# Patient Record
Sex: MALE | URBAN - METROPOLITAN AREA
[De-identification: names, ages, dates, MRNs, and addresses within clinical notes are randomized per-mention and may not be internally consistent; named-entity substitution may affect disease eponyms.]

---

## 2022-08-05 ENCOUNTER — TRANSFERRED RECORDS (OUTPATIENT)
Dept: HEALTH INFORMATION MANAGEMENT | Facility: CLINIC | Age: 47
End: 2022-08-05

## 2022-08-09 ENCOUNTER — MEDICAL CORRESPONDENCE (OUTPATIENT)
Dept: HEALTH INFORMATION MANAGEMENT | Facility: CLINIC | Age: 47
End: 2022-08-09

## 2022-08-10 ENCOUNTER — TRANSCRIBE ORDERS (OUTPATIENT)
Dept: OTHER | Age: 47
End: 2022-08-10

## 2022-08-10 DIAGNOSIS — E11.65 TYPE 2 DIABETES MELLITUS WITH HYPERGLYCEMIA (H): Primary | ICD-10-CM

## 2025-02-24 ENCOUNTER — HOSPITAL ENCOUNTER (EMERGENCY)
Facility: HOSPITAL | Age: 50
Discharge: HOME | End: 2025-02-24
Attending: EMERGENCY MEDICINE | Admitting: EMERGENCY MEDICINE
Payer: COMMERCIAL

## 2025-02-24 VITALS
BODY MASS INDEX: 31.27 KG/M2 | SYSTOLIC BLOOD PRESSURE: 142 MMHG | HEIGHT: 62 IN | HEART RATE: 75 BPM | TEMPERATURE: 97.8 F | DIASTOLIC BLOOD PRESSURE: 87 MMHG | OXYGEN SATURATION: 99 % | RESPIRATION RATE: 16 BRPM | WEIGHT: 169.9 LBS

## 2025-02-24 DIAGNOSIS — M54.12 CERVICAL RADICULOPATHY: ICD-10-CM

## 2025-02-24 LAB
ANION GAP SERPL CALCULATED.3IONS-SCNC: 8 MMOL/L (ref 7–15)
BASOPHILS # BLD AUTO: 0 10E3/UL (ref 0–0.2)
BASOPHILS NFR BLD AUTO: 1 %
BUN SERPL-MCNC: 16.1 MG/DL (ref 6–20)
CALCIUM SERPL-MCNC: 8.8 MG/DL (ref 8.8–10.4)
CHLORIDE SERPL-SCNC: 101 MMOL/L (ref 98–107)
CREAT SERPL-MCNC: 0.85 MG/DL (ref 0.67–1.17)
EGFRCR SERPLBLD CKD-EPI 2021: >90 ML/MIN/1.73M2
EOSINOPHIL # BLD AUTO: 0.2 10E3/UL (ref 0–0.7)
EOSINOPHIL NFR BLD AUTO: 3 %
ERYTHROCYTE [DISTWIDTH] IN BLOOD BY AUTOMATED COUNT: 12.8 % (ref 10–15)
GLUCOSE SERPL-MCNC: 426 MG/DL (ref 70–99)
HCO3 SERPL-SCNC: 28 MMOL/L (ref 22–29)
HCT VFR BLD AUTO: 42 % (ref 40–53)
HGB BLD-MCNC: 14.4 G/DL (ref 13.3–17.7)
IMM GRANULOCYTES # BLD: 0 10E3/UL
IMM GRANULOCYTES NFR BLD: 0 %
LYMPHOCYTES # BLD AUTO: 2.3 10E3/UL (ref 0.8–5.3)
LYMPHOCYTES NFR BLD AUTO: 33 %
MCH RBC QN AUTO: 28 PG (ref 26.5–33)
MCHC RBC AUTO-ENTMCNC: 34.3 G/DL (ref 31.5–36.5)
MCV RBC AUTO: 82 FL (ref 78–100)
MONOCYTES # BLD AUTO: 0.6 10E3/UL (ref 0–1.3)
MONOCYTES NFR BLD AUTO: 9 %
NEUTROPHILS # BLD AUTO: 3.7 10E3/UL (ref 1.6–8.3)
NEUTROPHILS NFR BLD AUTO: 54 %
NRBC # BLD AUTO: 0 10E3/UL
NRBC BLD AUTO-RTO: 0 /100
PLATELET # BLD AUTO: 199 10E3/UL (ref 150–450)
POTASSIUM SERPL-SCNC: 4.2 MMOL/L (ref 3.4–5.3)
RBC # BLD AUTO: 5.14 10E6/UL (ref 4.4–5.9)
SODIUM SERPL-SCNC: 137 MMOL/L (ref 135–145)
TROPONIN T SERPL HS-MCNC: 8 NG/L
WBC # BLD AUTO: 6.8 10E3/UL (ref 4–11)

## 2025-02-24 PROCEDURE — 36415 COLL VENOUS BLD VENIPUNCTURE: CPT | Performed by: EMERGENCY MEDICINE

## 2025-02-24 PROCEDURE — 84484 ASSAY OF TROPONIN QUANT: CPT | Performed by: EMERGENCY MEDICINE

## 2025-02-24 PROCEDURE — 82565 ASSAY OF CREATININE: CPT | Performed by: EMERGENCY MEDICINE

## 2025-02-24 PROCEDURE — 84295 ASSAY OF SERUM SODIUM: CPT | Performed by: EMERGENCY MEDICINE

## 2025-02-24 PROCEDURE — 82435 ASSAY OF BLOOD CHLORIDE: CPT | Performed by: EMERGENCY MEDICINE

## 2025-02-24 PROCEDURE — 93005 ELECTROCARDIOGRAM TRACING: CPT | Performed by: EMERGENCY MEDICINE

## 2025-02-24 PROCEDURE — 85025 COMPLETE CBC W/AUTO DIFF WBC: CPT | Performed by: EMERGENCY MEDICINE

## 2025-02-24 PROCEDURE — 99284 EMERGENCY DEPT VISIT MOD MDM: CPT

## 2025-02-24 RX ORDER — CYCLOBENZAPRINE HCL 10 MG
10 TABLET ORAL 3 TIMES DAILY PRN
Qty: 21 TABLET | Refills: 0 | Status: SHIPPED | OUTPATIENT
Start: 2025-02-24 | End: 2025-02-24

## 2025-02-24 RX ORDER — CYCLOBENZAPRINE HCL 10 MG
10 TABLET ORAL 3 TIMES DAILY PRN
Qty: 21 TABLET | Refills: 0 | Status: SHIPPED | OUTPATIENT
Start: 2025-02-24

## 2025-02-24 ASSESSMENT — ENCOUNTER SYMPTOMS: NECK PAIN: 1

## 2025-02-24 NOTE — DISCHARGE INSTRUCTIONS
Recommend follow-up with your primary care doctor and [facility].  Use Flexeril 3 times daily for muscle stiffness.Ibuprofen and/or Tylenol for pain

## 2025-02-24 NOTE — Clinical Note
Vidhi Lima was seen and treated in our emergency department on 2/24/2025.  He may return to work on 02/25/2025.       If you have any questions or concerns, please don't hesitate to call.      Anmol Romo MD

## 2025-02-24 NOTE — ED PROVIDER NOTES
"EMERGENCY DEPARTMENT ENCOUNTER      NAME: Vidhi Lima  AGE: 49 year old male  YOB: 1975  MRN: [id number]  EVALUATION DATE & TIME: No admission date for patient encounter.    PCP: Pamela Ellis    ED PROVIDER: Anmol Romo MD      Chief Complaint   Patient presents with    Headache    Arm Pain    Shoulder Pain         FINAL IMPRESSION:  1. Cervical radiculopathy          ED COURSE & MEDICAL DECISION MAKING:    Pertinent Labs & Imaging studies reviewed. (See chart for details)  49 year old male presents to the Emergency Department for evaluation of neck and shoulder pain.  On exam his history and exam seem most just unsympathetic back there is a COVID-19 weakness.  Emergent imaging not indicated.  With her urgent care today mentions chest pain\" EKG and troponin obtained here.  EKG and troponin are not suggestive of ACS.  Based on duration sliding-scale for GI bleed.  Plan for discharge home with Flexeril, follow-up with [facility] primary [facility]    ED Course as of 02/24/25 1452   Mon Feb 24, 2025   1155 I met with the patient for initial interview and encounter. We discussed a plan for treatment and diagnostic interventions.           Medical Decision Making  Obtained supplemental history:Supplemental history obtained?: No  Reviewed external records: External records reviewed?: No  Care impacted by chronic illness:Documented in Chart  Did you consider but not order tests?: Work up considered but not performed and documented in chart, if applicable  Did you interpret images independently?: Independent interpretation of ECG and images noted in documentation, when applicable.  Consultation discussion with other provider:Did you involve another provider (consultant, MH, pharmacy, etc.)?: No  Discharge. I prescribed additional prescription strength medication(s) as charted. I considered admission, but ultimately discharged patient reassuring cardiac workup.    MIPS (CTPE, Dental pain, Rodriguez, Sinusitis, " Asthma/COPD, Head Trauma): Not Applicable            At the conclusion of the encounter I discussed the results of all of the tests and the disposition. The questions were answered. The patient or family acknowledged understanding and was agreeable with the care plan.         MEDICATIONS GIVEN IN THE EMERGENCY:  Medications - No data to display    NEW PRESCRIPTIONS STARTED AT TODAY'S ER VISIT  New Prescriptions    CYCLOBENZAPRINE (FLEXERIL) 10 MG TABLET    Take 1 tablet (10 mg) by mouth 3 times daily as needed for muscle spasms.          =================================================================    TRIAGE ASSESSMENT:  For the past week pt has had pain in the back of the head that radiates down L shoulder and L arm.      Denies injury.              HPI    Patient information was obtained from: the patient     Use of : Yes (MARTII) - Language Gordy Lima is a 49 year old male with no pertinent medical history who presents to this ED by private car for evaluation of arm pain and chest pain.     The patient reports waking up with sudden left upper-arm pain about 1 week ago. States the pain radiates to his left-sided neck and there is tingly sensation associated. States his neck feels stiff. He has been taking chencho medication for the pain. 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     The patient also reports going to urgent care prior to arrival for intermittent chest pain but they advised him to present here instead. Denies any chest pain right now.     The patient denies any other complaints at this time.       REVIEW OF SYSTEMS   Review of Systems   Cardiovascular:  Negative for chest pain (intermittent, resolved).   Musculoskeletal:  Positive for neck pain (left side).        Reports left arm pain.          PAST MEDICAL HISTORY:  History reviewed. No pertinent past medical history.    PAST SURGICAL HISTORY:  History reviewed. No pertinent surgical history.        CURRENT MEDICATIONS:   "  cyclobenzaprine (FLEXERIL) 10 MG tablet        ALLERGIES:  No Known Allergies    FAMILY HISTORY:  History reviewed. No pertinent family history.    SOCIAL HISTORY:   Social History     Socioeconomic History    Marital status: Single   Tobacco Use    Smoking status: Never     Passive exposure: Never    Smokeless tobacco: Never   Vaping Use    Vaping status: Never Used     Social Drivers of Health      Received from [facility]    Financial Resource Strain    Received from [facility]    Social Connections       VITALS:  BP (!) 144/80   Pulse 80   Temp 97.8  F (36.6  C)   Resp 18   Ht 1.562 m (5' 1.5\")   Wt 77.1 kg (169 lb 14.4 oz)   SpO2 99%   BMI 31.58 kg/m      PHYSICAL EXAM      Vitals: BP (!) 144/80   Pulse 80   Temp 97.8  F (36.6  C)   Resp 18   Ht 1.562 m (5' 1.5\")   Wt 77.1 kg (169 lb 14.4 oz)   SpO2 99%   BMI 31.58 kg/m    General: Appears in no acute distress, awake, alert, interactive.  Eyes: Conjunctivae non-injected. Sclera anicteric.  HENT: Atraumatic.  Neck: Supple.  Respiratory/Chest: Respiration unlabored.  No wheezing or rhonchi  Abdomen: non distended  Musculoskeletal: Normal extremities. No edema or erythema.  limited range of motion bilateral shoulders.  Here reports neck stiffness.  Overall neck seems supple.  No weakness to extremity  Skin: Normal color. No rash or diaphoresis.  Neurologic: Face symmetric, moves all extremities spontaneously. Speech clear.  Psychiatric: Oriented to person, place, and time. Affect appropriate.    LAB:  All pertinent labs reviewed and interpreted.  Results for orders placed or performed during the hospital encounter of 02/24/25   Result Value Ref Range    Troponin T, High Sensitivity 8 <=22 ng/L   Basic metabolic panel   Result Value Ref Range    Sodium 137 135 - 145 mmol/L    Potassium 4.2 3.4 - 5.3 mmol/L    Chloride 101 98 - 107 mmol/L    Carbon Dioxide (CO2) 28 22 - 29 mmol/L    " Anion Gap 8 7 - 15 mmol/L    Urea Nitrogen 16.1 6.0 - 20.0 mg/dL    Creatinine 0.85 0.67 - 1.17 mg/dL    GFR Estimate >90 >60 mL/min/1.73m2    Calcium 8.8 8.8 - 10.4 mg/dL    Glucose 426 (H) 70 - 99 mg/dL   CBC with platelets and differential   Result Value Ref Range    WBC Count 6.8 4.0 - 11.0 10e3/uL    RBC Count 5.14 4.40 - 5.90 10e6/uL    Hemoglobin 14.4 13.3 - 17.7 g/dL    Hematocrit 42.0 40.0 - 53.0 %    MCV 82 78 - 100 fL    MCH 28.0 26.5 - 33.0 pg    MCHC 34.3 31.5 - 36.5 g/dL    RDW 12.8 10.0 - 15.0 %    Platelet Count 199 150 - 450 10e3/uL    % Neutrophils 54 %    % Lymphocytes 33 %    % Monocytes 9 %    % Eosinophils 3 %    % Basophils 1 %    % Immature Granulocytes 0 %    NRBCs per 100 WBC 0 <1 /100    Absolute Neutrophils 3.7 1.6 - 8.3 10e3/uL    Absolute Lymphocytes 2.3 0.8 - 5.3 10e3/uL    Absolute Monocytes 0.6 0.0 - 1.3 10e3/uL    Absolute Eosinophils 0.2 0.0 - 0.7 10e3/uL    Absolute Basophils 0.0 0.0 - 0.2 10e3/uL    Absolute Immature Granulocytes 0.0 <=0.4 10e3/uL    Absolute NRBCs 0.0 10e3/uL       RADIOLOGY:  Reviewed all pertinent imaging. Please see official radiology report.  No orders to display       EKG:    Performed at: 2/24/2025 11:58    Impression: Sinus rhythm. Inferior infarct, age undetermined. Possible Anterolateral infarct, age undetermined.     Rate: 81 bpm  Rhythm: Sinus rhythm  Axis: 64  -14  13  CO Interval: 140 ms  QRS Interval: 98 ms  QTc Interval: 434 ms  ST Changes: None  Comparison: No previous ECGs available    I have independently reviewed and interpreted the EKG(s) documented above.      IDaniele, am serving as a scribe to document services personally performed by Anmol Romo MD based on my observation and the provider's statements to me. I, Anmol Romo MD, attest that Daniele Rose is acting in a scribe capacity, has observed my performance of the services and has documented them in accordance with my direction.    Anmol Romo MD  [facility]  [facility] EMERGENCY DEPARTMENT  [address] [place] [id number]  [phone]      Anmol Romo MD  02/24/25 [id number]

## 2025-02-24 NOTE — ED TRIAGE NOTES
For the past week pt has had pain in the back of the head that radiates down L shoulder and L arm.      Denies injury.

## 2025-03-01 LAB
ATRIAL RATE - MUSE: 81 BPM
DIASTOLIC BLOOD PRESSURE - MUSE: NORMAL MMHG
INTERPRETATION ECG - MUSE: NORMAL
P AXIS - MUSE: 64 DEGREES
PR INTERVAL - MUSE: 140 MS
QRS DURATION - MUSE: 98 MS
QT - MUSE: 374 MS
QTC - MUSE: 434 MS
R AXIS - MUSE: -14 DEGREES
SYSTOLIC BLOOD PRESSURE - MUSE: NORMAL MMHG
T AXIS - MUSE: 13 DEGREES
VENTRICULAR RATE- MUSE: 81 BPM